# Patient Record
Sex: MALE | Race: WHITE | NOT HISPANIC OR LATINO | URBAN - METROPOLITAN AREA
[De-identification: names, ages, dates, MRNs, and addresses within clinical notes are randomized per-mention and may not be internally consistent; named-entity substitution may affect disease eponyms.]

---

## 2021-02-09 ENCOUNTER — OFFICE VISIT (OUTPATIENT)
Dept: URBAN - METROPOLITAN AREA CLINIC 78 | Facility: CLINIC | Age: 81
End: 2021-02-09
Payer: MEDICARE

## 2021-02-09 DIAGNOSIS — I77.9 CAROTID ARTERIAL DISEASE: ICD-10-CM

## 2021-02-09 DIAGNOSIS — R19.5 FECAL OCCULT BLOOD TEST POSITIVE: ICD-10-CM

## 2021-02-09 DIAGNOSIS — I25.9 CAD (CORONARY ARTERY DISEASE): ICD-10-CM

## 2021-02-09 DIAGNOSIS — K50.80 CROHN'S DISEASE OF BOTH SMALL AND LARGE INTESTINE: ICD-10-CM

## 2021-02-09 DIAGNOSIS — K59.09 OTHER CONSTIPATION: ICD-10-CM

## 2021-02-09 PROCEDURE — 99204 OFFICE O/P NEW MOD 45 MIN: CPT | Performed by: INTERNAL MEDICINE

## 2021-02-09 PROCEDURE — G8482 FLU IMMUNIZE ORDER/ADMIN: HCPCS | Performed by: INTERNAL MEDICINE

## 2021-02-09 PROCEDURE — 99244 OFF/OP CNSLTJ NEW/EST MOD 40: CPT | Performed by: INTERNAL MEDICINE

## 2021-02-09 RX ORDER — SODIUM, POTASSIUM,MAG SULFATES 17.5-3.13G
354 ML SOLUTION, RECONSTITUTED, ORAL ORAL
Qty: 1 | Refills: 0 | OUTPATIENT
Start: 2021-02-09

## 2021-02-09 NOTE — HPI-OTHER HISTORIES
The patient was referred to us by Dr. Juana Gardner for + FOBT.  A copy of this note will be sent to the referring physician.   The patient states that when he was 70 years old he had both E/C followed by a Pillcam at which time he was diagnosed with Crohn's disease (?). He was asymptomatic at that time. He was given a medication by his GI but states he never took it.  There is no recent history of rectal bleeding or melena. The patient has no pertinent additional complaints of abdominal pain, diarrhea, anorexia or unintentional weight loss.   He has been having new-onset constipation. He has not changed his diet in any way. He has been using Miralax daily with limited relief (although he does experience 'blowouts' at times). He has noted increased bloating.   Regarding any upper GI complaints, the patient has not had heartburn, nausea, vomiting or dysphagia.  He has no lower teeth, hence when he takes his pills he can't "trap them easily in his mouth" and has some trouble swallowing these.  	 The patient does not take blood thinners. They deny any CP or RYDER. He is very active. 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  The patient can't recall if his last colonoscopy was 10 years ago or sooner. He has had colon polyps removed in the past.  There is no FH of colon cancer or colon polyps.

## 2021-05-05 ENCOUNTER — OFFICE VISIT (OUTPATIENT)
Dept: URBAN - METROPOLITAN AREA SURGERY CENTER 15 | Facility: SURGERY CENTER | Age: 81
End: 2021-05-05
Payer: MEDICARE

## 2021-05-05 ENCOUNTER — TELEPHONE ENCOUNTER (OUTPATIENT)
Dept: URBAN - METROPOLITAN AREA CLINIC 78 | Facility: CLINIC | Age: 81
End: 2021-05-05

## 2021-05-05 DIAGNOSIS — R19.5 ABNORMAL FECES: ICD-10-CM

## 2021-05-05 PROCEDURE — G8907 PT DOC NO EVENTS ON DISCHARG: HCPCS | Performed by: INTERNAL MEDICINE

## 2021-05-05 PROCEDURE — 45378 DIAGNOSTIC COLONOSCOPY: CPT | Performed by: INTERNAL MEDICINE

## 2021-05-05 RX ORDER — SODIUM, POTASSIUM,MAG SULFATES 17.5-3.13G
354 ML SOLUTION, RECONSTITUTED, ORAL ORAL
Qty: 1 | Refills: 0 | Status: ACTIVE | COMMUNITY
Start: 2021-02-09

## 2021-05-22 ENCOUNTER — TELEPHONE ENCOUNTER (OUTPATIENT)
Dept: URBAN - METROPOLITAN AREA CLINIC 78 | Facility: CLINIC | Age: 81
End: 2021-05-22

## 2021-06-22 ENCOUNTER — OFFICE VISIT (OUTPATIENT)
Dept: URBAN - METROPOLITAN AREA CLINIC 78 | Facility: CLINIC | Age: 81
End: 2021-06-22

## 2021-08-27 ENCOUNTER — DASHBOARD ENCOUNTERS (OUTPATIENT)
Age: 81
End: 2021-08-27

## 2021-08-27 ENCOUNTER — OFFICE VISIT (OUTPATIENT)
Dept: URBAN - METROPOLITAN AREA CLINIC 78 | Facility: CLINIC | Age: 81
End: 2021-08-27
Payer: MEDICARE

## 2021-08-27 VITALS
HEART RATE: 53 BPM | WEIGHT: 155.4 LBS | HEIGHT: 68 IN | BODY MASS INDEX: 23.55 KG/M2 | DIASTOLIC BLOOD PRESSURE: 79 MMHG | SYSTOLIC BLOOD PRESSURE: 122 MMHG | TEMPERATURE: 97.3 F

## 2021-08-27 DIAGNOSIS — K59.01 CONSTIPATION: ICD-10-CM

## 2021-08-27 DIAGNOSIS — I25.9 CAD (CORONARY ARTERY DISEASE): ICD-10-CM

## 2021-08-27 DIAGNOSIS — R19.5 FECAL OCCULT BLOOD TEST POSITIVE: ICD-10-CM

## 2021-08-27 DIAGNOSIS — I77.9 CAROTID ARTERIAL DISEASE: ICD-10-CM

## 2021-08-27 DIAGNOSIS — K50.90 CROHN DISEASE: ICD-10-CM

## 2021-08-27 PROBLEM — 53741008 CORONARY ARTERY DISEASE: Status: ACTIVE | Noted: 2021-02-09

## 2021-08-27 PROBLEM — 34000006 CROHN DISEASE: Status: ACTIVE | Noted: 2021-02-09

## 2021-08-27 PROBLEM — 371160000 CAROTID ARTERY DISEASE: Status: ACTIVE | Noted: 2021-02-09

## 2021-08-27 PROBLEM — 14760008 CONSTIPATION: Status: ACTIVE | Noted: 2021-02-09

## 2021-08-27 PROCEDURE — 99214 OFFICE O/P EST MOD 30 MIN: CPT | Performed by: INTERNAL MEDICINE

## 2021-08-27 RX ORDER — GLIPIZIDE 10 MG/1
1 TABLET 30 MINUTES BEFORE BREAKFAST TABLET ORAL ONCE A DAY
Status: ACTIVE | COMMUNITY

## 2021-08-27 RX ORDER — AMLODIPINE BESYLATE 5 MG/1
1 TABLET TABLET ORAL ONCE A DAY
Status: ACTIVE | COMMUNITY

## 2021-08-27 RX ORDER — ASPIRIN 81 MG/1
1 TABLET TABLET, CHEWABLE ORAL ONCE A DAY
Status: ACTIVE | COMMUNITY

## 2021-08-27 RX ORDER — OMEPRAZOLE MAGNESIUM 2.5 MG/1
AS DIRECTED GRANULE, DELAYED RELEASE ORAL
Status: DISCONTINUED | COMMUNITY

## 2021-08-27 RX ORDER — SODIUM, POTASSIUM,MAG SULFATES 17.5-3.13G
354 ML SOLUTION, RECONSTITUTED, ORAL ORAL
Qty: 1 | Refills: 0 | Status: DISCONTINUED | COMMUNITY
Start: 2021-02-09

## 2021-08-27 RX ORDER — MELATONIN 1 MG/ML
1 CAPSULE LIQUID (ML) ORAL ONCE A DAY
Status: ACTIVE | COMMUNITY

## 2021-08-27 RX ORDER — LEVOTHYROXINE SODIUM 0.07 MG/1
1 TABLET IN THE MORNING ON AN EMPTY STOMACH TABLET ORAL ONCE A DAY
Status: ACTIVE | COMMUNITY

## 2021-08-27 RX ORDER — GLIPIZIDE 5 MG/1
1 TABLET 30 MINUTES BEFORE SUPPER TABLET ORAL ONCE A DAY
Status: ACTIVE | COMMUNITY

## 2021-08-27 RX ORDER — GLUCOSAMINE/CHONDR SU A SOD 750-600 MG
1 CAPSULE TABLET ORAL ONCE A DAY
Status: ACTIVE | COMMUNITY

## 2021-08-27 RX ORDER — PRAVASTATIN SODIUM 80 MG/1
1 TABLET TABLET ORAL ONCE A DAY
Status: ACTIVE | COMMUNITY

## 2021-08-27 RX ORDER — VIT A/VIT C/VIT E/ZINC/COPPER 4296-226
AS DIRECTED CAPSULE ORAL
Status: ACTIVE | COMMUNITY

## 2021-08-27 RX ORDER — OMEPRAZOLE MAGNESIUM 20.6 MG/1
1 TABLET 30 MINUTES BEFORE MORNING MEAL TABLET, DELAYED RELEASE ORAL ONCE A DAY
Status: ACTIVE | COMMUNITY

## 2021-08-27 RX ORDER — SITAGLIPTIN 50 MG/1
1 TABLET TABLET, FILM COATED ORAL ONCE A DAY
Status: ON HOLD | COMMUNITY

## 2021-08-27 RX ORDER — METFORMIN HYDROCHLORIDE 500 MG/1
1 TABLET WITH A MEAL TABLET, FILM COATED ORAL ONCE A DAY
Status: ACTIVE | COMMUNITY

## 2021-08-27 NOTE — HPI-OTHER HISTORIES
The patient was referred to us by Dr. Juana Gardner for + FOBT.  A copy of this note will be sent to the referring physician.   The patient states that when he was 70 years old he had both E/C followed by a Pillcam at which time he was diagnosed with Crohn's disease (?). He was asymptomatic at that time. He was given Pentasa by his GI but states he took it for a week and could not tolerate the AEs.  There is no recent history of rectal bleeding or melena. The patient has no pertinent additional complaints of abdominal pain, diarrhea, anorexia or unintentional weight loss.   He had been having constipation. He has not changed his diet in any way. He has been eating a peach daily and has been having regular BM's. When  it is not peach season, he will drink prune juice daily.  He has been using Miralax daily with limited relief (although he does experience 'blowouts' at times). He has noted increased bloating.   Regarding any upper GI complaints, the patient has not had heartburn, nausea, vomiting or dysphagia.  He has no lower teeth, hence when he takes his pills he can't "trap them easily in his mouth" and has some trouble swallowing these.  	 The patient does not take blood thinners. They deny any CP or RYDER. He is very active. 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  He has had colon polyps removed in the past.  There is no FH of colon cancer or colon polyps.   He just came home after a long trip since June. He visited [place], [place] and planning to leave again.    He has had both his COVID vaccines.   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  - Labs on 4/13/2021 revealed a hemoglobin A1c of 7.1% Vitamin D 39.1, triglycerides 195, glucose 136, BUN 24, creatinine is 1.6, sodium 140, potassium 4.9, chloride 105, calcium 9.5, total protein 7.0, albumin 4.3, total bilirubin 0.3, alkaline phosphatase 80, AST 24, ALT 17,. - Bloodwork on 3/9/2021 disclosed a glucose of 251, BUN 22, creatinine 1.6, sodium 137, potassium 4.5, calcium 9.3, UA negative.  PTH intact normal at 41, TSH 2.12, free T4 1.28, fecal occult blood positive, WBC 6.2, hemoglobin 12.2 (normal 13.0-17.7) MCV 91, platelets 233. - B12 on 8/6/2020 was normal at 907. - Colonoscopy by Dr. George on 5/25/10: Normal to the cecum. No evidence of Crohn's.  - Pillcam on 8/5/08 by Dr. Ivy: Multiple ulcers seen in in the small bowel. Based on these findings he was diagnosed with Crohn's disease. He was started on Pentasa but did not tolerate it.  - EGD in 2008: Normal esoph, atral erythema, normal duodenum.

## 2024-05-10 ENCOUNTER — CLAIMS CREATED FROM THE CLAIM WINDOW (OUTPATIENT)
Dept: URBAN - METROPOLITAN AREA MEDICAL CENTER 10 | Facility: MEDICAL CENTER | Age: 84
End: 2024-05-10
Payer: MEDICARE

## 2024-05-10 DIAGNOSIS — K92.1 MELENA: ICD-10-CM

## 2024-05-10 DIAGNOSIS — D64.89 NORMOCYTIC ANEMIA: ICD-10-CM

## 2024-05-10 PROCEDURE — G8427 DOCREV CUR MEDS BY ELIG CLIN: HCPCS | Performed by: INTERNAL MEDICINE

## 2024-05-10 PROCEDURE — 99254 IP/OBS CNSLTJ NEW/EST MOD 60: CPT | Performed by: INTERNAL MEDICINE

## 2024-05-10 PROCEDURE — 99222 1ST HOSP IP/OBS MODERATE 55: CPT | Performed by: INTERNAL MEDICINE

## 2024-05-11 ENCOUNTER — CLAIMS CREATED FROM THE CLAIM WINDOW (OUTPATIENT)
Dept: URBAN - METROPOLITAN AREA MEDICAL CENTER 10 | Facility: MEDICAL CENTER | Age: 84
End: 2024-05-11
Payer: MEDICARE

## 2024-05-11 ENCOUNTER — CLAIMS CREATED FROM THE CLAIM WINDOW (OUTPATIENT)
Dept: URBAN - METROPOLITAN AREA MEDICAL CENTER 10 | Facility: MEDICAL CENTER | Age: 84
End: 2024-05-11

## 2024-05-11 DIAGNOSIS — K92.1 MELENA: ICD-10-CM

## 2024-05-11 DIAGNOSIS — D64.89 NORMOCYTIC ANEMIA: ICD-10-CM

## 2024-05-11 PROCEDURE — 99233 SBSQ HOSP IP/OBS HIGH 50: CPT | Performed by: PHYSICIAN ASSISTANT

## 2024-05-11 PROCEDURE — 99232 SBSQ HOSP IP/OBS MODERATE 35: CPT | Performed by: INTERNAL MEDICINE

## 2024-05-12 ENCOUNTER — CLAIMS CREATED FROM THE CLAIM WINDOW (OUTPATIENT)
Dept: URBAN - METROPOLITAN AREA MEDICAL CENTER 10 | Facility: MEDICAL CENTER | Age: 84
End: 2024-05-12

## 2024-05-12 ENCOUNTER — CLAIMS CREATED FROM THE CLAIM WINDOW (OUTPATIENT)
Dept: URBAN - METROPOLITAN AREA MEDICAL CENTER 10 | Facility: MEDICAL CENTER | Age: 84
End: 2024-05-12
Payer: MEDICARE

## 2024-05-12 DIAGNOSIS — Z87.19 PERSONAL HISTORY OF OTHER DISEASES OF THE DIGESTIVE SYSTEM: ICD-10-CM

## 2024-05-12 DIAGNOSIS — K92.1 MELENA: ICD-10-CM

## 2024-05-12 DIAGNOSIS — D50.0 BLOOD LOSS ANEMIA: ICD-10-CM

## 2024-05-12 PROCEDURE — 99232 SBSQ HOSP IP/OBS MODERATE 35: CPT | Performed by: PHYSICIAN ASSISTANT

## 2024-05-12 PROCEDURE — 99232 SBSQ HOSP IP/OBS MODERATE 35: CPT | Performed by: INTERNAL MEDICINE

## 2024-05-13 ENCOUNTER — CLAIMS CREATED FROM THE CLAIM WINDOW (OUTPATIENT)
Dept: URBAN - METROPOLITAN AREA MEDICAL CENTER 10 | Facility: MEDICAL CENTER | Age: 84
End: 2024-05-13
Payer: MEDICARE

## 2024-05-13 DIAGNOSIS — K55.20 ACQUIRED ARTERIOVENOUS MALFORMATION OF COLON: ICD-10-CM

## 2024-05-13 DIAGNOSIS — K92.1 ACUTE MELENA: ICD-10-CM

## 2024-05-13 DIAGNOSIS — D12.8 ADENOMATOUS POLYP OF RECTUM: ICD-10-CM

## 2024-05-13 DIAGNOSIS — D12.2 ADENOMA OF ASCENDING COLON: ICD-10-CM

## 2024-05-13 PROCEDURE — 45380 COLONOSCOPY AND BIOPSY: CPT | Performed by: INTERNAL MEDICINE

## 2024-05-13 PROCEDURE — 45385 COLONOSCOPY W/LESION REMOVAL: CPT | Performed by: INTERNAL MEDICINE

## 2024-05-13 PROCEDURE — 45388 COLONOSCOPY W/ABLATION: CPT | Performed by: INTERNAL MEDICINE

## 2024-05-14 ENCOUNTER — CLAIMS CREATED FROM THE CLAIM WINDOW (OUTPATIENT)
Dept: URBAN - METROPOLITAN AREA MEDICAL CENTER 10 | Facility: MEDICAL CENTER | Age: 84
End: 2024-05-14
Payer: MEDICARE

## 2024-05-14 DIAGNOSIS — K55.21 ANGIODYSPLASIA OF COLON WITH HEMORRHAGE: ICD-10-CM

## 2024-05-14 DIAGNOSIS — D64.89 ANEMIA DUE TO OTHER CAUSE, NOT CLASSIFIED: ICD-10-CM

## 2024-05-14 DIAGNOSIS — K92.1 MELENA: ICD-10-CM

## 2024-05-14 PROCEDURE — 99231 SBSQ HOSP IP/OBS SF/LOW 25: CPT | Performed by: PHYSICIAN ASSISTANT

## 2024-07-09 ENCOUNTER — OFFICE VISIT (OUTPATIENT)
Dept: URBAN - METROPOLITAN AREA CLINIC 78 | Facility: CLINIC | Age: 84
End: 2024-07-09

## 2024-12-12 ENCOUNTER — OFFICE VISIT (OUTPATIENT)
Dept: URBAN - METROPOLITAN AREA CLINIC 78 | Facility: CLINIC | Age: 84
End: 2024-12-12
Payer: MEDICARE

## 2024-12-12 ENCOUNTER — TELEPHONE ENCOUNTER (OUTPATIENT)
Dept: URBAN - METROPOLITAN AREA CLINIC 78 | Facility: CLINIC | Age: 84
End: 2024-12-12

## 2024-12-12 VITALS
SYSTOLIC BLOOD PRESSURE: 143 MMHG | HEIGHT: 68 IN | HEART RATE: 63 BPM | DIASTOLIC BLOOD PRESSURE: 77 MMHG | BODY MASS INDEX: 23.19 KG/M2 | WEIGHT: 153 LBS | TEMPERATURE: 98 F

## 2024-12-12 DIAGNOSIS — I77.9 CAROTID ARTERIAL DISEASE: ICD-10-CM

## 2024-12-12 DIAGNOSIS — K50.90 CROHN DISEASE: ICD-10-CM

## 2024-12-12 DIAGNOSIS — I25.9 CAD (CORONARY ARTERY DISEASE): ICD-10-CM

## 2024-12-12 DIAGNOSIS — R19.5 FECAL OCCULT BLOOD TEST POSITIVE: ICD-10-CM

## 2024-12-12 DIAGNOSIS — K59.01 CONSTIPATION: ICD-10-CM

## 2024-12-12 PROCEDURE — 99214 OFFICE O/P EST MOD 30 MIN: CPT | Performed by: INTERNAL MEDICINE

## 2024-12-12 RX ORDER — GLIPIZIDE 10 MG/1
1 TABLET 30 MINUTES BEFORE BREAKFAST TABLET ORAL ONCE A DAY
Status: ACTIVE | COMMUNITY

## 2024-12-12 RX ORDER — ASCORBIC ACID 250 MG
1 TABLET TABLET,CHEWABLE ORAL ONCE A DAY
Status: ACTIVE | COMMUNITY

## 2024-12-12 RX ORDER — MELATONIN 1 MG/ML
1 CAPSULE LIQUID (ML) ORAL ONCE A DAY
Status: ACTIVE | COMMUNITY

## 2024-12-12 RX ORDER — NYSTATIN 100000 [USP'U]/G
APPLY TO THE AFFECTED AREA(S) TOPICALLY TWICE DAILY POWDER TOPICAL
Qty: 30 UNSPECIFIED | Refills: 0 | Status: ON HOLD | COMMUNITY

## 2024-12-12 RX ORDER — OMEPRAZOLE MAGNESIUM 20.6 MG/1
1 TABLET 30 MINUTES BEFORE MORNING MEAL TABLET, DELAYED RELEASE ORAL ONCE A DAY
Status: ACTIVE | COMMUNITY

## 2024-12-12 RX ORDER — ALFUZOSIN HYDROCHLORIDE 10 MG/1
TAKE ONE TABLET BY MOUTH ONE TIME DAILY AFTER SAME MEAL EACH DAY TABLET, FILM COATED, EXTENDED RELEASE ORAL
Qty: 90 UNSPECIFIED | Refills: 0 | Status: ACTIVE | COMMUNITY

## 2024-12-12 RX ORDER — LEVOTHYROXINE SODIUM 0.07 MG/1
1 TABLET IN THE MORNING ON AN EMPTY STOMACH TABLET ORAL ONCE A DAY
Status: ACTIVE | COMMUNITY

## 2024-12-12 RX ORDER — AMLODIPINE BESYLATE 5 MG/1
1 TABLET TABLET ORAL ONCE A DAY
Status: ACTIVE | COMMUNITY

## 2024-12-12 RX ORDER — ASPIRIN 81 MG/1
1 TABLET TABLET, CHEWABLE ORAL ONCE A DAY
Status: ACTIVE | COMMUNITY

## 2024-12-12 RX ORDER — PRAVASTATIN SODIUM 80 MG/1
1 TABLET TABLET ORAL ONCE A DAY
Status: ACTIVE | COMMUNITY

## 2024-12-12 RX ORDER — METFORMIN HYDROCHLORIDE 500 MG/1
1 TABLET WITH A MEAL TABLET, FILM COATED ORAL ONCE A DAY
Status: ACTIVE | COMMUNITY

## 2024-12-12 RX ORDER — CHOLECALCIFEROL (VITAMIN D3) 125 MCG
1 CAPSULE CAPSULE ORAL ONCE A DAY
Status: ACTIVE | COMMUNITY

## 2024-12-12 RX ORDER — GLIPIZIDE 5 MG/1
1 TABLET 30 MINUTES BEFORE SUPPER TABLET ORAL ONCE A DAY
Status: ON HOLD | COMMUNITY

## 2024-12-12 RX ORDER — LINACLOTIDE 145 UG/1
TAKE ONE CAPSULE BY MOUTH ONE TIME DAILY ON AN EMPTY STOMACH 30 MINUTES BEFORE 1ST MEAL OF THE DAY CAPSULE, GELATIN COATED ORAL
Qty: 30 UNSPECIFIED | Refills: 3 | Status: ACTIVE | COMMUNITY

## 2024-12-12 RX ORDER — VIT A/VIT C/VIT E/ZINC/COPPER 4296-226
AS DIRECTED CAPSULE ORAL
Status: ACTIVE | COMMUNITY

## 2024-12-12 RX ORDER — GLIPIZIDE 10 MG/1
TAKE ONE TABLET BY MOUTH EVERY MORNING 30 MINUTES BEFORE BREAKFAST TABLET ORAL
Qty: 90 UNSPECIFIED | Refills: 2 | Status: ACTIVE | COMMUNITY

## 2024-12-12 RX ORDER — SITAGLIPTIN 50 MG/1
1 TABLET TABLET, FILM COATED ORAL ONCE A DAY
Status: ON HOLD | COMMUNITY

## 2024-12-12 RX ORDER — SITAGLIPTIN 100 MG/1
TABLET, FILM COATED ORAL
Qty: 30 TABLET | Status: ACTIVE | COMMUNITY

## 2024-12-12 NOTE — HPI-OTHER HISTORIES
The patient was referred to us by Dr. Juana Gardner for a history of + FOBT and reportedly Crohns disease.  A copy of this note will be sent to the referring physician.   The patient states that when he was 70 years old he had both E/C followed by a Pillcam at which time he was diagnosed with Crohn's disease (?). He was asymptomatic at that time. He was given Pentasa by his GI but states he took it for a week and could not tolerate the AEs.  There is no recent history of rectal bleeding or melena. The patient has no pertinent additional complaints of abdominal pain, diarrhea, anorexia or unintentional weight loss.   He has been eating a peach daily and has been having regular BM's. When  it is not peach season, he will drink prune juice daily. He saw a tiny bit of blood of in the TP on Thanksgiving day but this had not happened previously nor has it recurred.   Regarding any upper GI complaints, the patient has not had heartburn, nausea, vomiting or dysphagia.   	 The patient does not take blood thinners. They deny any CP or RYDER. He is very active. 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  He had a treadmill stress test  and ECHO in 2024 which were unremarkable.   He has had colon polyps removed in the past.  There is no FH of colon cancer or colon polyps.   He will be going to [place].   He has had  Summary of prior workup: - Labs ordered by Dr. Juana Gardner on 8/'21 revealed a HbA1c of 7.5%, free T4 1.39, TSH 3.2.  WBC 7.3, hemoglobin 12.2, MCV 90, platelets 236.  Triglycerides 253.  Glucose 134, BUN 20, creatinine 1.3, sodium 141, potassium 4.5, calcium 10.0, vitamin D 39.  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  - Labs on 4/13/2021 revealed a hemoglobin A1c of 7.1% Vitamin D 39.1, triglycerides 195, glucose 136, BUN 24, creatinine is 1.6, sodium 140, potassium 4.9, chloride 105, calcium 9.5, total protein 7.0, albumin 4.3, total bilirubin 0.3, alkaline phosphatase 80, AST 24, ALT 17,. - Bloodwork on 3/9/2021 disclosed a glucose of 251, BUN 22, creatinine 1.6, sodium 137, potassium 4.5, calcium 9.3, UA negative.  PTH intact normal at 41, TSH 2.12, free T4 1.28, fecal occult blood positive, WBC 6.2, hemoglobin 12.2 (normal 13.0-17.7) MCV 91, platelets 233. - B12 on 8/6/2020 was normal at 907. - Colonoscopy by Dr. George on 5/25/10: Normal to the cecum. No evidence of Crohn's.  - Pillcam on 8/5/08 by Dr. Ivy: Multiple ulcers seen in in the small bowel. Based on these findings he was diagnosed with Crohn's disease. He was started on Pentasa but did not tolerate it.  - EGD in 2008: Normal esoph, atral erythema, normal duodenum.